# Patient Record
Sex: MALE | Race: WHITE
[De-identification: names, ages, dates, MRNs, and addresses within clinical notes are randomized per-mention and may not be internally consistent; named-entity substitution may affect disease eponyms.]

---

## 2024-02-23 ENCOUNTER — APPOINTMENT (OUTPATIENT)
Dept: OTOLARYNGOLOGY | Facility: CLINIC | Age: 69
End: 2024-02-23
Payer: MEDICARE

## 2024-02-23 DIAGNOSIS — Z82.49 FAMILY HISTORY OF ISCHEMIC HEART DISEASE AND OTHER DISEASES OF THE CIRCULATORY SYSTEM: ICD-10-CM

## 2024-02-23 DIAGNOSIS — Z78.9 OTHER SPECIFIED HEALTH STATUS: ICD-10-CM

## 2024-02-23 DIAGNOSIS — H90.3 SENSORINEURAL HEARING LOSS, BILATERAL: ICD-10-CM

## 2024-02-23 DIAGNOSIS — H61.20 IMPACTED CERUMEN, UNSPECIFIED EAR: ICD-10-CM

## 2024-02-23 DIAGNOSIS — Z87.09 PERSONAL HISTORY OF OTHER DISEASES OF THE RESPIRATORY SYSTEM: ICD-10-CM

## 2024-02-23 DIAGNOSIS — Z80.9 FAMILY HISTORY OF MALIGNANT NEOPLASM, UNSPECIFIED: ICD-10-CM

## 2024-02-23 PROBLEM — Z00.00 ENCOUNTER FOR PREVENTIVE HEALTH EXAMINATION: Status: ACTIVE | Noted: 2024-02-23

## 2024-02-23 PROCEDURE — 92557 COMPREHENSIVE HEARING TEST: CPT

## 2024-02-23 PROCEDURE — G0268 REMOVAL OF IMPACTED WAX MD: CPT

## 2024-02-23 PROCEDURE — 92567 TYMPANOMETRY: CPT

## 2024-02-23 PROCEDURE — 99203 OFFICE O/P NEW LOW 30 MIN: CPT | Mod: 25

## 2024-02-23 RX ORDER — ESCITALOPRAM OXALATE 5 MG/1
TABLET, FILM COATED ORAL
Refills: 0 | Status: ACTIVE | COMMUNITY

## 2024-02-23 RX ORDER — ROSUVASTATIN CALCIUM 5 MG/1
TABLET, FILM COATED ORAL
Refills: 0 | Status: ACTIVE | COMMUNITY

## 2024-02-23 RX ORDER — CAMPHOR 0.45 %
GEL (GRAM) TOPICAL
Refills: 0 | Status: ACTIVE | COMMUNITY

## 2024-02-23 RX ORDER — FINASTERIDE 1 MG/1
TABLET ORAL
Refills: 0 | Status: ACTIVE | COMMUNITY

## 2024-02-23 NOTE — DATA REVIEWED
[de-identified] : In light of the patients current symptoms, Complete audiometry was ordered and completed today. I have interpreted these results and reviewed them in detail with the patient.  Sloping sensorineural hearing loss moderate to severe bilaterally.  Reduced speech recognition bilaterally.

## 2024-02-23 NOTE — HISTORY OF PRESENT ILLNESS
[de-identified] : SATHISH MILLER has a history of bilateral sensorineural hearing loss for several years.  Using amplification bilaterally for years.  Possible some noise exposure at rock concerts in the past. Tinnitus reported humming bilaterally intermittently. Vertigo in the past treated with vestibular PT with benefit. No recurrence recently.   FH: father had profound hearing loss.

## 2024-02-23 NOTE — ASSESSMENT
[FreeTextEntry1] : Significant bilateral sensorineural hearing loss only using left hearing aid at this time.  His hearing aid is approximately 8 years old and may be outdated with regards to his current level of hearing.  We discussed this in detail.  I have recommended hearing aid evaluation for optimal fit.  He appears to be a candidate for cochlear implantation.  I have discussed this with him.  Annual follow-up and as needed.

## 2024-03-05 ENCOUNTER — APPOINTMENT (OUTPATIENT)
Dept: OTOLARYNGOLOGY | Facility: CLINIC | Age: 69
End: 2024-03-05